# Patient Record
Sex: FEMALE | Race: BLACK OR AFRICAN AMERICAN | NOT HISPANIC OR LATINO | URBAN - NONMETROPOLITAN AREA
[De-identification: names, ages, dates, MRNs, and addresses within clinical notes are randomized per-mention and may not be internally consistent; named-entity substitution may affect disease eponyms.]

---

## 2021-02-09 ENCOUNTER — OFFICE VISIT (OUTPATIENT)
Dept: URBAN - NONMETROPOLITAN AREA CLINIC 13 | Facility: CLINIC | Age: 46
End: 2021-02-09
Payer: COMMERCIAL

## 2021-02-09 DIAGNOSIS — R13.10 DYSPHAGIA: ICD-10-CM

## 2021-02-09 PROCEDURE — 99244 OFF/OP CNSLTJ NEW/EST MOD 40: CPT | Performed by: INTERNAL MEDICINE

## 2021-02-09 PROCEDURE — G8482 FLU IMMUNIZE ORDER/ADMIN: HCPCS | Performed by: INTERNAL MEDICINE

## 2021-02-09 RX ORDER — SITAGLIPTIN AND METFORMIN HYDROCHLORIDE 100; 1000 MG/1; MG/1
(PRIOR AUTH#:000000099025) TABLET, FILM COATED, EXTENDED RELEASE ORAL
Qty: 90 UNSPECIFIED | Status: ACTIVE | COMMUNITY

## 2021-02-09 RX ORDER — NITROFURANTOIN (MONOHYDRATE/MACROCRYSTALS) 75; 25 MG/1; MG/1
(PRIOR AUTH#:000000082234) CAPSULE ORAL
Qty: 14 CAP | Status: ACTIVE | COMMUNITY

## 2021-02-09 RX ORDER — PHENAZOPYRIDINE HYDROCHLORIDE 200 MG/1
(PRIOR AUTH#:000000082235) TABLET, FILM COATED ORAL
Qty: 20 DELAYED RELEASE TABLET | Status: ACTIVE | COMMUNITY

## 2021-02-09 RX ORDER — FLUCONAZOLE 150 MG/1
(PRIOR AUTH#:000000081621) TABLET ORAL
Qty: 2 DELAYED RELEASE TABLET | Status: ACTIVE | COMMUNITY

## 2021-02-09 RX ORDER — DEXLANSOPRAZOLE 60 MG/1
(PRIOR AUTH#:000000098833) CAPSULE, DELAYED RELEASE ORAL
Qty: 90 CAP | Status: ACTIVE | COMMUNITY

## 2021-02-09 RX ORDER — NYSTATIN AND TRIAMCINOLONE ACETONIDE 100000; 1 [USP'U]/G; MG/G
(PRIOR AUTH#:000000085948) CREAM TOPICAL
Qty: 30 UNSPECIFIED | Status: ACTIVE | COMMUNITY

## 2021-02-09 RX ORDER — PREDNISONE 20 MG/1
(PRIOR AUTH#:000000082898) TABLET ORAL
Qty: 14 DELAYED RELEASE TABLET | Status: ACTIVE | COMMUNITY

## 2021-02-09 RX ORDER — GLIPIZIDE 10 MG/1
(PRIOR AUTH#:000000069941) TABLET, FILM COATED, EXTENDED RELEASE ORAL
Qty: 90 DELAYED RELEASE TABLET | Status: ACTIVE | COMMUNITY

## 2021-02-09 RX ORDER — AMOXICILLIN AND CLAVULANATE POTASSIUM 875; 125 MG/1; MG/1
(PRIOR AUTH#:000000082897) TABLET, FILM COATED ORAL
Qty: 20 DELAYED RELEASE TABLET | Status: ACTIVE | COMMUNITY

## 2021-02-09 RX ORDER — INSULIN LISPRO 100 [IU]/ML
(PRIOR AUTH#:000000085542) INJECTION, SOLUTION INTRAVENOUS; SUBCUTANEOUS
Qty: 15 UNSPECIFIED | Status: ACTIVE | COMMUNITY

## 2021-02-09 RX ORDER — NORETHINDRONE 0.35 MG/1
(PRIOR AUTH#:000000081620) TABLET ORAL
Qty: 28 DELAYED RELEASE TABLET | Status: ACTIVE | COMMUNITY

## 2021-02-09 RX ORDER — INSULIN DETEMIR 100 [IU]/ML
(PRIOR AUTH#:000000098503) INJECTION, SOLUTION SUBCUTANEOUS
Qty: 15 UNSPECIFIED | Status: ACTIVE | COMMUNITY

## 2021-02-09 RX ORDER — DOXYCYCLINE HYCLATE 100 MG/1
(PRIOR AUTH#:000000083142) TABLET, FILM COATED ORAL
Qty: 20 DELAYED RELEASE TABLET | Status: ACTIVE | COMMUNITY

## 2021-02-09 NOTE — HPI-TODAY'S VISIT:
Mallory is a very pleasant 45-year-old woman referred by Dr. Moulton for reflux and dysphagia.  She has a long history of reflux and dysphagia.  She used to be followed by Dr. alvarez and was maintained on Nexium/omeprazole.  Currently she is on Dexilant.  She had a period of time when she was off her medicines and feel like her symptoms were worse at that time.  More recently she has had some issues with solid food dysphagia.  In addition she has had some pill dysphagia.  She has had to alter her diet.  She has not had any significant weight loss.  She is a nurse at [employer]

## 2021-03-05 ENCOUNTER — OFFICE VISIT (OUTPATIENT)
Dept: URBAN - METROPOLITAN AREA MEDICAL CENTER 1 | Facility: MEDICAL CENTER | Age: 46
End: 2021-03-05
Payer: COMMERCIAL

## 2021-03-05 DIAGNOSIS — K31.7 BENIGN GASTRIC POLYP: ICD-10-CM

## 2021-03-05 DIAGNOSIS — K20.80 ESOPHAGITIS, LOS ANGELES GRADE B: ICD-10-CM

## 2021-03-05 PROCEDURE — 43239 EGD BIOPSY SINGLE/MULTIPLE: CPT | Performed by: INTERNAL MEDICINE

## 2021-03-30 ENCOUNTER — LAB OUTSIDE AN ENCOUNTER (OUTPATIENT)
Dept: URBAN - NONMETROPOLITAN AREA CLINIC 13 | Facility: CLINIC | Age: 46
End: 2021-03-30

## 2021-03-30 ENCOUNTER — OFFICE VISIT (OUTPATIENT)
Dept: URBAN - NONMETROPOLITAN AREA CLINIC 13 | Facility: CLINIC | Age: 46
End: 2021-03-30
Payer: COMMERCIAL

## 2021-03-30 DIAGNOSIS — Z12.11 COLON CANCER SCREENING: ICD-10-CM

## 2021-03-30 DIAGNOSIS — R14.0 BLOATING: ICD-10-CM

## 2021-03-30 DIAGNOSIS — R13.10 DYSPHAGIA: ICD-10-CM

## 2021-03-30 DIAGNOSIS — Z83.71 FAMILY HISTORY OF COLONIC POLYPS: ICD-10-CM

## 2021-03-30 DIAGNOSIS — K21.00 GASTROESOPHAGEAL REFLUX DISEASE WITH ESOPHAGITIS WITHOUT HEMORRHAGE: ICD-10-CM

## 2021-03-30 PROCEDURE — 99214 OFFICE O/P EST MOD 30 MIN: CPT | Performed by: NURSE PRACTITIONER

## 2021-03-30 RX ORDER — GLIPIZIDE 10 MG/1
(PRIOR AUTH#:000000069941) TABLET, FILM COATED, EXTENDED RELEASE ORAL
Qty: 90 DELAYED RELEASE TABLET | Status: ACTIVE | COMMUNITY

## 2021-03-30 RX ORDER — NITROFURANTOIN (MONOHYDRATE/MACROCRYSTALS) 75; 25 MG/1; MG/1
(PRIOR AUTH#:000000082234) CAPSULE ORAL
Qty: 14 CAP | Status: ACTIVE | COMMUNITY

## 2021-03-30 RX ORDER — DEXLANSOPRAZOLE 60 MG/1
1 CAPSULE CAPSULE, DELAYED RELEASE ORAL ONCE A DAY
Qty: 90 CAPSULE | Refills: 3

## 2021-03-30 RX ORDER — HYOSCYAMINE SULFATE 0.12 MG/1
1 TABLET UNDER THE TONGUE AND ALLOW TO DISSOLVE EVERY 4-6 HRS  AS NEEDED FOR ABDOMINAL PAIN/SPASM TABLET, ORALLY DISINTEGRATING ORAL THREE TIMES A DAY
Qty: 90 | Refills: 3 | OUTPATIENT
Start: 2021-03-30 | End: 2021-07-28

## 2021-03-30 RX ORDER — DOXYCYCLINE HYCLATE 100 MG/1
(PRIOR AUTH#:000000083142) TABLET, FILM COATED ORAL
Qty: 20 DELAYED RELEASE TABLET | Status: ACTIVE | COMMUNITY

## 2021-03-30 RX ORDER — AMOXICILLIN AND CLAVULANATE POTASSIUM 875; 125 MG/1; MG/1
(PRIOR AUTH#:000000082897) TABLET, FILM COATED ORAL
Qty: 20 DELAYED RELEASE TABLET | Status: ACTIVE | COMMUNITY

## 2021-03-30 RX ORDER — INSULIN DETEMIR 100 [IU]/ML
(PRIOR AUTH#:000000098503) INJECTION, SOLUTION SUBCUTANEOUS
Qty: 15 UNSPECIFIED | Status: ACTIVE | COMMUNITY

## 2021-03-30 RX ORDER — NORETHINDRONE 0.35 MG/1
(PRIOR AUTH#:000000081620) TABLET ORAL
Qty: 28 DELAYED RELEASE TABLET | Status: ACTIVE | COMMUNITY

## 2021-03-30 RX ORDER — NYSTATIN AND TRIAMCINOLONE ACETONIDE 100000; 1 [USP'U]/G; MG/G
(PRIOR AUTH#:000000085948) CREAM TOPICAL
Qty: 30 UNSPECIFIED | Status: ACTIVE | COMMUNITY

## 2021-03-30 RX ORDER — PREDNISONE 20 MG/1
(PRIOR AUTH#:000000082898) TABLET ORAL
Qty: 14 DELAYED RELEASE TABLET | Status: ACTIVE | COMMUNITY

## 2021-03-30 RX ORDER — FLUCONAZOLE 150 MG/1
(PRIOR AUTH#:000000081621) TABLET ORAL
Qty: 2 DELAYED RELEASE TABLET | Status: ACTIVE | COMMUNITY

## 2021-03-30 RX ORDER — PHENAZOPYRIDINE HYDROCHLORIDE 200 MG/1
(PRIOR AUTH#:000000082235) TABLET, FILM COATED ORAL
Qty: 20 DELAYED RELEASE TABLET | Status: ACTIVE | COMMUNITY

## 2021-03-30 RX ORDER — DEXLANSOPRAZOLE 60 MG/1
(PRIOR AUTH#:000000098833) CAPSULE, DELAYED RELEASE ORAL
Qty: 90 CAP | Status: ACTIVE | COMMUNITY

## 2021-03-30 RX ORDER — SITAGLIPTIN AND METFORMIN HYDROCHLORIDE 100; 1000 MG/1; MG/1
(PRIOR AUTH#:000000099025) TABLET, FILM COATED, EXTENDED RELEASE ORAL
Qty: 90 UNSPECIFIED | Status: ACTIVE | COMMUNITY

## 2021-03-30 RX ORDER — FAMOTIDINE 20 MG/1
1 TABLET AT BEDTIME AS NEEDED TABLET, FILM COATED ORAL ONCE A DAY
Qty: 90 TABLET | Refills: 11 | OUTPATIENT
Start: 2021-03-30

## 2021-03-30 RX ORDER — INSULIN LISPRO 100 [IU]/ML
(PRIOR AUTH#:000000085542) INJECTION, SOLUTION INTRAVENOUS; SUBCUTANEOUS
Qty: 15 UNSPECIFIED | Status: ACTIVE | COMMUNITY

## 2021-03-30 NOTE — HPI-TODAY'S VISIT:
3/30/2021 Mallory is here for f/u of reflux and dysphagia.  She has a long history of reflux and dysphagia. She is on Dexilant.  At her last OV, she was having dysphagia to foods and pills. She had an EGD with a normal esophagus path with mild esophagitis. Today, her swallowing is better. Her reflux is well controlled.  She is having more gas. Her bowels move every day or every other. She has never had a colonoscopy. Her father has colon polyps starting at age 50. She denies any blood in her stool. CS

## 2021-04-23 ENCOUNTER — OFFICE VISIT (OUTPATIENT)
Dept: URBAN - METROPOLITAN AREA MEDICAL CENTER 1 | Facility: MEDICAL CENTER | Age: 46
End: 2021-04-23
Payer: COMMERCIAL

## 2021-04-23 DIAGNOSIS — Z83.71 FAMILY HISTORY OF COLON POLYPS: ICD-10-CM

## 2021-04-23 PROCEDURE — G0121 COLON CA SCRN NOT HI RSK IND: HCPCS | Performed by: INTERNAL MEDICINE

## 2021-04-23 PROCEDURE — G0105 COLORECTAL SCRN; HI RISK IND: HCPCS | Performed by: INTERNAL MEDICINE

## 2021-09-27 ENCOUNTER — DASHBOARD ENCOUNTERS (OUTPATIENT)
Age: 46
End: 2021-09-27

## 2021-09-28 ENCOUNTER — OFFICE VISIT (OUTPATIENT)
Dept: URBAN - NONMETROPOLITAN AREA CLINIC 13 | Facility: CLINIC | Age: 46
End: 2021-09-28
Payer: COMMERCIAL

## 2021-09-28 ENCOUNTER — WEB ENCOUNTER (OUTPATIENT)
Dept: URBAN - NONMETROPOLITAN AREA CLINIC 13 | Facility: CLINIC | Age: 46
End: 2021-09-28

## 2021-09-28 VITALS
DIASTOLIC BLOOD PRESSURE: 84 MMHG | HEIGHT: 69 IN | WEIGHT: 231.8 LBS | SYSTOLIC BLOOD PRESSURE: 125 MMHG | HEART RATE: 81 BPM | BODY MASS INDEX: 34.33 KG/M2

## 2021-09-28 DIAGNOSIS — Z12.11 COLON CANCER SCREENING: ICD-10-CM

## 2021-09-28 DIAGNOSIS — R13.19 DYSPHAGIA: ICD-10-CM

## 2021-09-28 DIAGNOSIS — R14.0 BLOATING: ICD-10-CM

## 2021-09-28 DIAGNOSIS — Z83.71 FAMILY HISTORY OF COLONIC POLYPS: ICD-10-CM

## 2021-09-28 DIAGNOSIS — K21.00 GASTROESOPHAGEAL REFLUX DISEASE WITH ESOPHAGITIS WITHOUT HEMORRHAGE: ICD-10-CM

## 2021-09-28 PROBLEM — 40739000 DYSPHAGIA: Status: ACTIVE | Noted: 2021-02-09

## 2021-09-28 PROCEDURE — 99213 OFFICE O/P EST LOW 20 MIN: CPT | Performed by: INTERNAL MEDICINE

## 2021-09-28 RX ORDER — DEXLANSOPRAZOLE 60 MG/1
1 CAPSULE CAPSULE, DELAYED RELEASE ORAL ONCE A DAY
Qty: 90 CAPSULE | Refills: 3 | COMMUNITY

## 2021-09-28 RX ORDER — FAMOTIDINE 20 MG/1
1 TABLET AT BEDTIME AS NEEDED TABLET, FILM COATED ORAL ONCE A DAY
Qty: 90 TABLET | Refills: 11 | COMMUNITY
Start: 2021-03-30

## 2021-09-28 RX ORDER — INSULIN LISPRO 100 [IU]/ML
(PRIOR AUTH#:000000085542) INJECTION, SOLUTION INTRAVENOUS; SUBCUTANEOUS
Qty: 15 UNSPECIFIED | COMMUNITY

## 2021-09-28 RX ORDER — NITROFURANTOIN (MONOHYDRATE/MACROCRYSTALS) 75; 25 MG/1; MG/1
(PRIOR AUTH#:000000082234) CAPSULE ORAL
Qty: 14 CAP | COMMUNITY

## 2021-09-28 RX ORDER — INSULIN DETEMIR 100 [IU]/ML
(PRIOR AUTH#:000000098503) INJECTION, SOLUTION SUBCUTANEOUS
Qty: 15 UNSPECIFIED | COMMUNITY

## 2021-09-28 RX ORDER — PHENAZOPYRIDINE HYDROCHLORIDE 200 MG/1
(PRIOR AUTH#:000000082235) TABLET, FILM COATED ORAL
Qty: 20 DELAYED RELEASE TABLET | COMMUNITY

## 2021-09-28 RX ORDER — NORETHINDRONE 0.35 MG/1
(PRIOR AUTH#:000000081620) TABLET ORAL
Qty: 28 DELAYED RELEASE TABLET | COMMUNITY

## 2021-09-28 RX ORDER — NYSTATIN AND TRIAMCINOLONE ACETONIDE 100000; 1 [USP'U]/G; MG/G
(PRIOR AUTH#:000000085948) CREAM TOPICAL
Qty: 30 UNSPECIFIED | COMMUNITY

## 2021-09-28 RX ORDER — SITAGLIPTIN AND METFORMIN HYDROCHLORIDE 100; 1000 MG/1; MG/1
(PRIOR AUTH#:000000099025) TABLET, FILM COATED, EXTENDED RELEASE ORAL
Qty: 90 UNSPECIFIED | COMMUNITY

## 2021-09-28 RX ORDER — DEXLANSOPRAZOLE 60 MG/1
1 CAPSULE CAPSULE, DELAYED RELEASE ORAL ONCE A DAY
Qty: 90 CAPSULE | Refills: 3

## 2021-09-28 RX ORDER — FAMOTIDINE 20 MG/1
1 TABLET AT BEDTIME AS NEEDED TABLET, FILM COATED ORAL ONCE A DAY
Qty: 90 TABLET | Refills: 11 | OUTPATIENT

## 2021-09-28 RX ORDER — PREDNISONE 20 MG/1
(PRIOR AUTH#:000000082898) TABLET ORAL
Qty: 14 DELAYED RELEASE TABLET | COMMUNITY

## 2021-09-28 RX ORDER — GLIPIZIDE 10 MG/1
(PRIOR AUTH#:000000069941) TABLET, FILM COATED, EXTENDED RELEASE ORAL
Qty: 90 DELAYED RELEASE TABLET | COMMUNITY

## 2021-09-28 RX ORDER — DOXYCYCLINE HYCLATE 100 MG/1
(PRIOR AUTH#:000000083142) TABLET, FILM COATED ORAL
Qty: 20 DELAYED RELEASE TABLET | COMMUNITY

## 2021-09-28 RX ORDER — AMOXICILLIN AND CLAVULANATE POTASSIUM 875; 125 MG/1; MG/1
(PRIOR AUTH#:000000082897) TABLET, FILM COATED ORAL
Qty: 20 DELAYED RELEASE TABLET | COMMUNITY

## 2021-09-28 RX ORDER — FLUCONAZOLE 150 MG/1
(PRIOR AUTH#:000000081621) TABLET ORAL
Qty: 2 DELAYED RELEASE TABLET | COMMUNITY

## 2021-09-28 NOTE — HPI-OTHER HISTORIES
3/30/2021 Mallory is here for f/u of reflux and dysphagia.  She has a long history of reflux and dysphagia. She is on Dexilant.  At her last OV, she was having dysphagia to foods and pills. She had an EGD with a normal esophagus path with mild esophagitis. Today, her swallowing is better. Her reflux is well controlled.  She is having more gas. Her bowels move every day or every other. She has never had a colonoscopy. Her father has colon polyps starting at age 50. She denies any blood in her stool.    4/2021 Colonoscopy: normal.
